# Patient Record
Sex: MALE | Employment: UNEMPLOYED
[De-identification: names, ages, dates, MRNs, and addresses within clinical notes are randomized per-mention and may not be internally consistent; named-entity substitution may affect disease eponyms.]

---
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
## 2023-02-08 DIAGNOSIS — Q20.8 FONTAN CIRCULATION PRESENT: Primary | ICD-10-CM

## 2023-02-08 DIAGNOSIS — Q22.4 TRICUSPID ATRESIA: ICD-10-CM

## 2023-06-07 ENCOUNTER — APPOINTMENT (OUTPATIENT)
Dept: URBAN - METROPOLITAN AREA CLINIC 247 | Age: 18
Setting detail: DERMATOLOGY
End: 2023-06-07

## 2023-06-07 DIAGNOSIS — L70.0 ACNE VULGARIS: ICD-10-CM

## 2023-06-07 PROCEDURE — OTHER PRESCRIPTION: OTHER

## 2023-06-07 PROCEDURE — OTHER COUNSELING: OTHER

## 2023-06-07 PROCEDURE — OTHER PRESCRIPTION MEDICATION MANAGEMENT: OTHER

## 2023-06-07 PROCEDURE — OTHER MIPS QUALITY: OTHER

## 2023-06-07 PROCEDURE — 99214 OFFICE O/P EST MOD 30 MIN: CPT

## 2023-06-07 RX ORDER — CLASCOTERONE 1 G/100G
CREAM TOPICAL
Qty: 60 | Refills: 5 | Status: ERX | COMMUNITY
Start: 2023-06-07

## 2023-06-07 RX ORDER — TRETIONIN 0.25 MG/G
CREAM TOPICAL QHS
Qty: 45 | Refills: 5 | Status: ERX | COMMUNITY
Start: 2023-06-07

## 2023-06-07 ASSESSMENT — LOCATION ZONE DERM
LOCATION ZONE: TRUNK
LOCATION ZONE: FACE

## 2023-06-07 ASSESSMENT — LOCATION DETAILED DESCRIPTION DERM
LOCATION DETAILED: LEFT MEDIAL MALAR CHEEK
LOCATION DETAILED: LEFT SUPERIOR UPPER BACK

## 2023-06-07 ASSESSMENT — LOCATION SIMPLE DESCRIPTION DERM
LOCATION SIMPLE: LEFT CHEEK
LOCATION SIMPLE: LEFT UPPER BACK

## 2023-06-07 NOTE — PROCEDURE: COUNSELING
Minocycline Pregnancy And Lactation Text: This medication is Pregnancy Category D and not consider safe during pregnancy. It is also excreted in breast milk.
High Dose Vitamin A Pregnancy And Lactation Text: High dose vitamin A therapy is contraindicated during pregnancy and breast feeding.
Topical Retinoid counseling:  Patient advised to apply a pea-sized amount only at bedtime and wait 30 minutes after washing their face before applying.  If too drying, patient may add a non-comedogenic moisturizer. The patient verbalized understanding of the proper use and possible adverse effects of retinoids.  All of the patient's questions and concerns were addressed.
Aklief Pregnancy And Lactation Text: It is unknown if this medication is safe to use during pregnancy.  It is unknown if this medication is excreted in breast milk.  Breastfeeding women should use the topical cream on the smallest area of the skin for the shortest time needed while breastfeeding.  Do not apply to nipple and areola.
Benzoyl Peroxide Pregnancy And Lactation Text: This medication is Pregnancy Category C. It is unknown if benzoyl peroxide is excreted in breast milk.
Erythromycin Counseling:  I discussed with the patient the risks of erythromycin including but not limited to GI upset, allergic reaction, drug rash, diarrhea, increase in liver enzymes, and yeast infections.
Topical Retinoid Pregnancy And Lactation Text: This medication is Pregnancy Category C. It is unknown if this medication is excreted in breast milk.
Topical Sulfur Applications Counseling: Topical Sulfur Counseling: Patient counseled that this medication may cause skin irritation or allergic reactions.  In the event of skin irritation, the patient was advised to reduce the amount of the drug applied or use it less frequently.   The patient verbalized understanding of the proper use and possible adverse effects of topical sulfur application.  All of the patient's questions and concerns were addressed.
Detail Level: Zone
Topical Clindamycin Counseling: Patient counseled that this medication may cause skin irritation or allergic reactions.  In the event of skin irritation, the patient was advised to reduce the amount of the drug applied or use it less frequently.   The patient verbalized understanding of the proper use and possible adverse effects of clindamycin.  All of the patient's questions and concerns were addressed.
Isotretinoin Counseling: Patient should get monthly blood tests, not donate blood, not drive at night if vision affected, not share medication, and not undergo elective surgery for 6 months after tx completed. Side effects reviewed, pt to contact office should one occur.
High Dose Vitamin A Counseling: Side effects reviewed, pt to contact office should one occur.
Spironolactone Counseling: Patient advised regarding risks of diarrhea, abdominal pain, hyperkalemia, birth defects (for female patients), liver toxicity and renal toxicity. The patient may need blood work to monitor liver and kidney function and potassium levels while on therapy. The patient verbalized understanding of the proper use and possible adverse effects of spironolactone.  All of the patient's questions and concerns were addressed.
Topical Sulfur Applications Pregnancy And Lactation Text: This medication is Pregnancy Category C and has an unknown safety profile during pregnancy. It is unknown if this topical medication is excreted in breast milk.
Dapsone Pregnancy And Lactation Text: This medication is Pregnancy Category C and is not considered safe during pregnancy or breast feeding.
Benzoyl Peroxide Counseling: Patient counseled that medicine may cause skin irritation and bleach clothing.  In the event of skin irritation, the patient was advised to reduce the amount of the drug applied or use it less frequently.   The patient verbalized understanding of the proper use and possible adverse effects of benzoyl peroxide.  All of the patient's questions and concerns were addressed.
Bpo Recommendations: OTC Cerave 4% BPO wash
Winlevi Pregnancy And Lactation Text: This medication is considered safe during pregnancy and breastfeeding.
Birth Control Pills Pregnancy And Lactation Text: This medication should be avoided if pregnant and for the first 30 days post-partum.
Azithromycin Pregnancy And Lactation Text: This medication is considered safe during pregnancy and is also secreted in breast milk.
Minocycline Counseling: Patient advised regarding possible photosensitivity and discoloration of the teeth, skin, lips, tongue and gums.  Patient instructed to avoid sunlight, if possible.  When exposed to sunlight, patients should wear protective clothing, sunglasses, and sunscreen.  The patient was instructed to call the office immediately if the following severe adverse effects occur:  hearing changes, easy bruising/bleeding, severe headache, or vision changes.  The patient verbalized understanding of the proper use and possible adverse effects of minocycline.  All of the patient's questions and concerns were addressed.
Azelaic Acid Pregnancy And Lactation Text: This medication is considered safe during pregnancy and breast feeding.
Erythromycin Pregnancy And Lactation Text: This medication is Pregnancy Category B and is considered safe during pregnancy. It is also excreted in breast milk.
Birth Control Pills Counseling: Birth Control Pill Counseling: I discussed with the patient the potential side effects of OCPs including but not limited to increased risk of stroke, heart attack, thrombophlebitis, deep venous thrombosis, hepatic adenomas, breast changes, GI upset, headaches, and depression.  The patient verbalized understanding of the proper use and possible adverse effects of OCPs. All of the patient's questions and concerns were addressed.
Tazorac Counseling:  Patient advised that medication is irritating and drying.  Patient may need to apply sparingly and wash off after an hour before eventually leaving it on overnight.  The patient verbalized understanding of the proper use and possible adverse effects of tazorac.  All of the patient's questions and concerns were addressed.
Use Enhanced Medication Counseling?: No
Tazorac Pregnancy And Lactation Text: This medication is not safe during pregnancy. It is unknown if this medication is excreted in breast milk.
Cleanser Recommendations: cerave or cetaphil gentle cleanser
Azithromycin Counseling:  I discussed with the patient the risks of azithromycin including but not limited to GI upset, allergic reaction, drug rash, diarrhea, and yeast infections.
Sarecycline Counseling: Patient advised regarding possible photosensitivity and discoloration of the teeth, skin, lips, tongue and gums.  Patient instructed to avoid sunlight, if possible.  When exposed to sunlight, patients should wear protective clothing, sunglasses, and sunscreen.  The patient was instructed to call the office immediately if the following severe adverse effects occur:  hearing changes, easy bruising/bleeding, severe headache, or vision changes.  The patient verbalized understanding of the proper use and possible adverse effects of sarecycline.  All of the patient's questions and concerns were addressed.
Sunscreen Recommendations: zinc oxide spf 30-50 with reapplication every 2 hours\\nelta md clear tinted
Isotretinoin Pregnancy And Lactation Text: This medication is Pregnancy Category X and is considered extremely dangerous during pregnancy. It is unknown if it is excreted in breast milk.
Spironolactone Pregnancy And Lactation Text: This medication can cause feminization of the male fetus and should be avoided during pregnancy. The active metabolite is also found in breast milk.
Winlevi Counseling:  I discussed with the patient the risks of topical clascoterone including but not limited to erythema, scaling, itching, and stinging. Patient voiced their understanding.
Moisturizer Recommendations: neutrogena hydroboost\\ncetaphil/cerave
Doxycycline Pregnancy And Lactation Text: This medication is Pregnancy Category D and not consider safe during pregnancy. It is also excreted in breast milk but is considered safe for shorter treatment courses.
Bactrim Pregnancy And Lactation Text: This medication is Pregnancy Category D and is known to cause fetal risk.  It is also excreted in breast milk.
Dapsone Counseling: I discussed with the patient the risks of dapsone including but not limited to hemolytic anemia, agranulocytosis, rashes, methemoglobinemia, kidney failure, peripheral neuropathy, headaches, GI upset, and liver toxicity.  Patients who start dapsone require monitoring including baseline LFTs and weekly CBCs for the first month, then every month thereafter.  The patient verbalized understanding of the proper use and possible adverse effects of dapsone.  All of the patient's questions and concerns were addressed.
Topical Clindamycin Pregnancy And Lactation Text: This medication is Pregnancy Category B and is considered safe during pregnancy. It is unknown if it is excreted in breast milk.
Doxycycline Counseling:  Patient counseled regarding possible photosensitivity and increased risk for sunburn.  Patient instructed to avoid sunlight, if possible.  When exposed to sunlight, patients should wear protective clothing, sunglasses, and sunscreen.  The patient was instructed to call the office immediately if the following severe adverse effects occur:  hearing changes, easy bruising/bleeding, severe headache, or vision changes.  The patient verbalized understanding of the proper use and possible adverse effects of doxycycline.  All of the patient's questions and concerns were addressed.
Aklief counseling:  Patient advised to apply a pea-sized amount only at bedtime and wait 30 minutes after washing their face before applying.  If too drying, patient may add a non-comedogenic moisturizer.  The most commonly reported side effects including irritation, redness, scaling, dryness, stinging, burning, itching, and increased risk of sunburn.  The patient verbalized understanding of the proper use and possible adverse effects of retinoids.  All of the patient's questions and concerns were addressed.
Azelaic Acid Counseling: Patient counseled that medicine may cause skin irritation and to avoid applying near the eyes.  In the event of skin irritation, the patient was advised to reduce the amount of the drug applied or use it less frequently.   The patient verbalized understanding of the proper use and possible adverse effects of azelaic acid.  All of the patient's questions and concerns were addressed.
Tetracycline Counseling: Patient counseled regarding possible photosensitivity and increased risk for sunburn.  Patient instructed to avoid sunlight, if possible.  When exposed to sunlight, patients should wear protective clothing, sunglasses, and sunscreen.  The patient was instructed to call the office immediately if the following severe adverse effects occur:  hearing changes, easy bruising/bleeding, severe headache, or vision changes.  The patient verbalized understanding of the proper use and possible adverse effects of tetracycline.  All of the patient's questions and concerns were addressed. Patient understands to avoid pregnancy while on therapy due to potential birth defects.
Bactrim Counseling:  I discussed with the patient the risks of sulfa antibiotics including but not limited to GI upset, allergic reaction, drug rash, diarrhea, dizziness, photosensitivity, and yeast infections.  Rarely, more serious reactions can occur including but not limited to aplastic anemia, agranulocytosis, methemoglobinemia, blood dyscrasias, liver or kidney failure, lung infiltrates or desquamative/blistering drug rashes.

## 2023-06-07 NOTE — PROCEDURE: PRESCRIPTION MEDICATION MANAGEMENT
Render In Strict Bullet Format?: No
Initiate Treatment: Winlevi 1 % topical cream \\nQuantity: 60.0 g  Days Supply: 30\\nSig: Apply to entire face twice a day, follow with moisturizer and SPF in daytime.
Samples Given: Winlevi
Discontinue Regimen: Amzeeq
Detail Level: Zone
Continue Regimen: tretinoin 0.025 % topical cream QHS\\nQuantity: 45.0 g  Days Supply: 30\\nSig: Apply a pea sized amount at bedtime, follow with moisturizer

## 2023-06-15 ENCOUNTER — TELEPHONE (OUTPATIENT)
Dept: PEDIATRIC CARDIOLOGY | Age: 18
End: 2023-06-15

## 2023-06-16 ENCOUNTER — APPOINTMENT (OUTPATIENT)
Dept: CARDIOLOGY | Age: 18
End: 2023-06-16
Attending: PEDIATRICS

## 2023-07-07 ENCOUNTER — OFFICE VISIT (OUTPATIENT)
Dept: CARDIOLOGY | Age: 18
End: 2023-07-07

## 2023-07-07 ENCOUNTER — ANCILLARY PROCEDURE (OUTPATIENT)
Dept: PEDIATRIC CARDIOLOGY | Age: 18
End: 2023-07-07
Attending: PEDIATRICS

## 2023-07-07 VITALS
HEART RATE: 105 BPM | DIASTOLIC BLOOD PRESSURE: 70 MMHG | WEIGHT: 166.01 LBS | TEMPERATURE: 99.3 F | HEIGHT: 68 IN | SYSTOLIC BLOOD PRESSURE: 118 MMHG | OXYGEN SATURATION: 92 % | BODY MASS INDEX: 25.16 KG/M2 | RESPIRATION RATE: 16 BRPM

## 2023-07-07 DIAGNOSIS — Q20.8 FONTAN CIRCULATION PRESENT: ICD-10-CM

## 2023-07-07 DIAGNOSIS — Q22.4 TRICUSPID ATRESIA: ICD-10-CM

## 2023-07-07 DIAGNOSIS — Z98.890 STATUS POST FONTAN PROCEDURE: ICD-10-CM

## 2023-07-07 DIAGNOSIS — Q20.4 SINGLE VENTRICLE: Primary | ICD-10-CM

## 2023-07-07 PROCEDURE — 99211 OFF/OP EST MAY X REQ PHY/QHP: CPT

## 2023-07-07 PROCEDURE — 93325 DOPPLER ECHO COLOR FLOW MAPG: CPT | Performed by: PEDIATRICS

## 2023-07-07 PROCEDURE — 93320 DOPPLER ECHO COMPLETE: CPT | Performed by: PEDIATRICS

## 2023-07-07 PROCEDURE — 93320 DOPPLER ECHO COMPLETE: CPT

## 2023-07-07 PROCEDURE — 99215 OFFICE O/P EST HI 40 MIN: CPT | Performed by: PEDIATRICS

## 2023-07-07 PROCEDURE — 93303 ECHO TRANSTHORACIC: CPT | Performed by: PEDIATRICS

## 2023-07-10 LAB
AORTIC ROOT: 2.96 CM (ref 2.35–3.34)
AORTIC VALVE ANNULUS: 2.4 CM (ref 1.66–2.43)
BSA FOR PED ECHO PROCEDURE: 1.91 M2
EJECTION FRACTION: 57 %
FRACTIONAL SHORTENING MMODE: 32 %
LEFT VENTRICLE EJECTION FRACTION BY TEICHOLZ 2D (%): 57 %
LEFT VENTRICLE EJECTION FRACTION BY TEICHOLZ M-MODE (%): 60 %
LV END-DIASTOLIC ENDOCARDIAL DIAMETER MMODE: 4.99 CM (ref 4.33–6.08)
LVIDS BY MMODE: 3.39 CM
SINOTUBULAR JUNCTION: 2.59 CM (ref 1.9–2.77)
Z SCORE OF AORTIC VALVE ANNULUS PHN: 1.8 CM
Z SCORE OF LV END-DIASTOLIC ENDOCARDIAL DIAMETER MMODE: -0.5 CM
Z-SCORE OF AORTIC ROOT: 0.5 CM
Z-SCORE OF SINOTUBULAR JUNCTION PHN: 1.2 CM

## 2023-07-10 RX ORDER — TADALAFIL 20 MG/1
20 TABLET ORAL DAILY
Qty: 90 TABLET | Refills: 3 | Status: SHIPPED | OUTPATIENT
Start: 2023-07-10

## 2023-07-17 ENCOUNTER — TELEPHONE (OUTPATIENT)
Dept: PEDIATRIC CARDIOLOGY | Age: 18
End: 2023-07-17

## 2023-08-25 ENCOUNTER — OFFICE VISIT (OUTPATIENT)
Dept: CARDIOLOGY | Age: 18
End: 2023-08-25

## 2023-08-25 VITALS
HEART RATE: 88 BPM | DIASTOLIC BLOOD PRESSURE: 65 MMHG | BODY MASS INDEX: 24.52 KG/M2 | OXYGEN SATURATION: 94 % | SYSTOLIC BLOOD PRESSURE: 120 MMHG | HEIGHT: 68 IN | RESPIRATION RATE: 16 BRPM | WEIGHT: 161.82 LBS | TEMPERATURE: 99.7 F

## 2023-08-25 DIAGNOSIS — Q20.4 SINGLE VENTRICLE: Primary | ICD-10-CM

## 2023-08-25 DIAGNOSIS — Z98.890 STATUS POST FONTAN PROCEDURE: ICD-10-CM

## 2023-08-25 PROCEDURE — 99215 OFFICE O/P EST HI 40 MIN: CPT | Performed by: PEDIATRICS

## 2023-08-25 PROCEDURE — 99211 OFF/OP EST MAY X REQ PHY/QHP: CPT

## 2023-12-27 ENCOUNTER — OFFICE VISIT (OUTPATIENT)
Dept: OTHER | Facility: HOSPITAL | Age: 18
End: 2023-12-27
Attending: EMERGENCY MEDICINE

## 2023-12-27 DIAGNOSIS — Z00.00 ROUTINE GENERAL MEDICAL EXAMINATION AT A HEALTH CARE FACILITY: Primary | ICD-10-CM

## 2023-12-27 LAB
ALBUMIN SERPL-MCNC: 4.6 G/DL (ref 3.2–4.8)
ALBUMIN/GLOB SERPL: 1.5 {RATIO} (ref 1–2)
ALP LIVER SERPL-CCNC: 70 U/L
ALT SERPL-CCNC: 33 U/L
ANION GAP SERPL CALC-SCNC: 4 MMOL/L (ref 0–18)
AST SERPL-CCNC: 28 U/L (ref ?–34)
ATRIAL RATE: 79 BPM
BASOPHILS # BLD AUTO: 0.02 X10(3) UL (ref 0–0.2)
BASOPHILS NFR BLD AUTO: 0.4 %
BILIRUB SERPL-MCNC: 1.8 MG/DL (ref 0.3–1.2)
BILIRUB UR QL: NEGATIVE
BUN BLD-MCNC: 11 MG/DL (ref 9–23)
BUN/CREAT SERPL: 9.7 (ref 10–20)
CALCIUM BLD-MCNC: 9.6 MG/DL (ref 8.7–10.4)
CHLORIDE SERPL-SCNC: 109 MMOL/L (ref 98–112)
CHOLEST SERPL-MCNC: 147 MG/DL (ref ?–200)
CLARITY UR: CLEAR
CO2 SERPL-SCNC: 26 MMOL/L (ref 21–32)
CREAT BLD-MCNC: 1.13 MG/DL
DEPRECATED RDW RBC AUTO: 43.1 FL (ref 35.1–46.3)
EGFRCR SERPLBLD CKD-EPI 2021: 95 ML/MIN/1.73M2 (ref 60–?)
EOSINOPHIL # BLD AUTO: 0.16 X10(3) UL (ref 0–0.7)
EOSINOPHIL NFR BLD AUTO: 3.1 %
ERYTHROCYTE [DISTWIDTH] IN BLOOD BY AUTOMATED COUNT: 12.9 % (ref 11–15)
FASTING PATIENT LIPID ANSWER: YES
FASTING STATUS PATIENT QL REPORTED: YES
GLOBULIN PLAS-MCNC: 3 G/DL (ref 2.8–4.4)
GLUCOSE BLD-MCNC: 88 MG/DL (ref 70–99)
GLUCOSE UR-MCNC: NORMAL MG/DL
HCT VFR BLD AUTO: 47.2 %
HDLC SERPL-MCNC: 55 MG/DL (ref 40–59)
HGB BLD-MCNC: 16.3 G/DL
HGB UR QL STRIP.AUTO: NEGATIVE
IMM GRANULOCYTES # BLD AUTO: 0.03 X10(3) UL (ref 0–1)
IMM GRANULOCYTES NFR BLD: 0.6 %
KETONES UR-MCNC: NEGATIVE MG/DL
LDLC SERPL CALC-MCNC: 81 MG/DL (ref ?–100)
LEUKOCYTE ESTERASE UR QL STRIP.AUTO: NEGATIVE
LYMPHOCYTES # BLD AUTO: 1.64 X10(3) UL (ref 1–4)
LYMPHOCYTES NFR BLD AUTO: 31.5 %
MCH RBC QN AUTO: 31.2 PG (ref 26–34)
MCHC RBC AUTO-ENTMCNC: 34.5 G/DL (ref 31–37)
MCV RBC AUTO: 90.4 FL
MONOCYTES # BLD AUTO: 0.39 X10(3) UL (ref 0.1–1)
MONOCYTES NFR BLD AUTO: 7.5 %
NEUTROPHILS # BLD AUTO: 2.96 X10 (3) UL (ref 1.5–7.7)
NEUTROPHILS # BLD AUTO: 2.96 X10(3) UL (ref 1.5–7.7)
NEUTROPHILS NFR BLD AUTO: 56.9 %
NITRITE UR QL STRIP.AUTO: NEGATIVE
NONHDLC SERPL-MCNC: 92 MG/DL (ref ?–130)
OSMOLALITY SERPL CALC.SUM OF ELEC: 287 MOSM/KG (ref 275–295)
P AXIS: 33 DEGREES
P-R INTERVAL: 108 MS
PH UR: 5.5 [PH] (ref 5–8)
PLATELET # BLD AUTO: 128 10(3)UL (ref 150–450)
POTASSIUM SERPL-SCNC: 3.6 MMOL/L (ref 3.5–5.1)
PROT SERPL-MCNC: 7.6 G/DL (ref 5.7–8.2)
PROT UR-MCNC: NEGATIVE MG/DL
Q-T INTERVAL: 358 MS
QRS DURATION: 114 MS
QTC CALCULATION (BEZET): 410 MS
R AXIS: -42 DEGREES
RBC # BLD AUTO: 5.22 X10(6)UL
SODIUM SERPL-SCNC: 139 MMOL/L (ref 136–145)
SP GR UR STRIP: 1.02 (ref 1–1.03)
T AXIS: 42 DEGREES
TRIGL SERPL-MCNC: 53 MG/DL (ref 30–149)
UROBILINOGEN UR STRIP-ACNC: NORMAL
VENTRICULAR RATE: 79 BPM
VLDLC SERPL CALC-MCNC: 8 MG/DL (ref 0–30)
WBC # BLD AUTO: 5.2 X10(3) UL (ref 4–11)

## 2023-12-27 PROCEDURE — 93010 ELECTROCARDIOGRAM REPORT: CPT | Performed by: INTERNAL MEDICINE

## 2023-12-27 PROCEDURE — 80061 LIPID PANEL: CPT

## 2023-12-27 PROCEDURE — 81003 URINALYSIS AUTO W/O SCOPE: CPT

## 2023-12-27 PROCEDURE — 93005 ELECTROCARDIOGRAM TRACING: CPT

## 2023-12-27 PROCEDURE — 80053 COMPREHEN METABOLIC PANEL: CPT

## 2023-12-27 PROCEDURE — 85025 COMPLETE CBC W/AUTO DIFF WBC: CPT

## 2024-07-01 ENCOUNTER — OFFICE VISIT (OUTPATIENT)
Dept: OTHER | Facility: HOSPITAL | Age: 19
End: 2024-07-01
Attending: EMERGENCY MEDICINE

## 2024-07-01 ENCOUNTER — HOSPITAL ENCOUNTER (OUTPATIENT)
Dept: GENERAL RADIOLOGY | Facility: HOSPITAL | Age: 19
Discharge: HOME | End: 2024-07-01
Attending: EMERGENCY MEDICINE

## 2024-07-01 DIAGNOSIS — R52 PAIN: Primary | ICD-10-CM

## 2024-07-01 DIAGNOSIS — R52 PAIN: ICD-10-CM

## 2024-07-01 PROCEDURE — 73564 X-RAY EXAM KNEE 4 OR MORE: CPT | Performed by: EMERGENCY MEDICINE

## 2024-07-11 ENCOUNTER — OFFICE VISIT (OUTPATIENT)
Dept: ORTHOPEDICS CLINIC | Facility: CLINIC | Age: 19
End: 2024-07-11

## 2024-07-11 VITALS — HEIGHT: 68 IN | BODY MASS INDEX: 24.25 KG/M2 | WEIGHT: 160 LBS

## 2024-07-11 DIAGNOSIS — M79.5 FOREIGN BODY (FB) IN SOFT TISSUE: Primary | ICD-10-CM

## 2024-07-11 PROCEDURE — 99244 OFF/OP CNSLTJ NEW/EST MOD 40: CPT | Performed by: ORTHOPAEDIC SURGERY

## 2024-07-11 NOTE — PROGRESS NOTES
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  He had a work-related injury on July 1.  He works as a .  They were doing a training exercise with a heavy saw.  He was cutting through metal rebar and some shavings from the rebar struck his right anterolateral thigh.  He developed multiple small lacerations related to the metal.  He had x-rays which showed foreign bodies in his soft tissue.  He has minimal pain currently unless he applies direct pressure to the area.  No mechanical clicking or popping in the knee no significant swelling in the knee.  He has no difficulty walking.  There is no difficulty sleeping at night    Past Medical History:    Congenital heart disease (HCC)    Eczema    Food allergy    Recurrent acute otitis media     Past Surgical History:   Procedure Laterality Date    Cardiac surgery       Current Outpatient Medications   Medication Sig Dispense Refill    aspirin 81 MG Oral Tab Take 81 mg by mouth daily. take 1 tablet (81MG)  by oral route  every day      Enalapril Maleate (VASOTEC) 2.5 MG Oral Tab Take 2.5 mg by mouth daily. take 1 tablet (2.5MG)  by oral route  every day       No Known Allergies  Family History   Problem Relation Age of Onset    Heart Disease Mother     High Blood Pressure Mother     Heart Disease Father     High Blood Pressure Father     High Blood Pressure Maternal Grandmother     Heart Disease Maternal Grandmother     Arthritis Maternal Grandmother     Stroke Maternal Grandmother     Heart Disease Maternal Grandfather     High Blood Pressure Maternal Grandfather     Heart Disease Paternal  Grandfather     High Blood Pressure Paternal Grandfather     Stroke Paternal Grandfather        Social History     Occupational History    Not on file   Tobacco Use    Smoking status: Not on file    Smokeless tobacco: Not on file   Substance and Sexual Activity    Alcohol use: Not on file    Drug use: Not on file    Sexual activity: Not on file        Review of Systems:  GENERAL: denies fevers, chills, night sweats, fatigue, unintentional weight loss/gain  SKIN: denies skin lesions, open sores, rash  HEENT:denies recent vision change, new nasal congestion,hearing loss, tinnitus, sore throat, headaches  RESPIRATORY: denies new shortness of breath, cough, asthma, wheezing  CARDIOVASCULAR: denies chest pain, leg cramps with exertion, palpitations, leg swelling  GI: denies abdominal pain, nausea, vomiting, diarrhea, constipation, hematochezia, worsening heartburn or stomach ulcers  : denies dysuria, hematuria, incontinence, increased frequency, urgency, difficulty urinating  MUSCULOSKELETAL: denies musculoskeletal complaints other than in HPI  NEURO: denies numbness, tingling, weakness, balance issues, dizziness, memory loss  PSYCHIATRIC: denies Hx of depression, anxiety, other psychiatric disorders  HEMATOLOGIC: denies blood clots, anemia, blood clotting disorders, blood transfusion  ENDOCRINE: denies autoimmune disease, thyroid issues, or diabetes  ALLERGY: denies asthma, seasonal allergies    Physical Examination:    Ht 5' 8\" (1.727 m)   Wt 160 lb (72.6 kg)   BMI 24.33 kg/m²   Constitutional: appears well hydrated, alert and responsive, no acute distress noted  Extremities: Right thigh with multiple small abrasion wounds.  2 of the wounds are more tender with palpable foreign body in the subcutaneous layer.  No erythema or cellulitic changes.  Right knee normal to inspection palpation range of motion testing.  Right hip normal to inspection palpation range of motion testing.  Thigh compartments are  soft  Neurological: Light touch and pinprick sensation intact throughout the lower extremities.  Ankle dorsiflexion plantarflexion EHL knee extension and hip flexion strength are 5 out of 5 bilaterally.  No clonus.    Imaging:   XR KNEE, COMPLETE (4 OR MORE VIEWS), RIGHT (CPT=73564)    Result Date: 7/1/2024  PROCEDURE: XR KNEE, COMPLETE (4 OR MORE VIEWS), RIGHT (CPT=73564)  COMPARISON: None.  INDICATIONS: Pain and brusing of right knee post injury today.  Evaluation for foreign body.  TECHNIQUE: 4 views were obtained.   FINDINGS:  BONES: No evidence of acute fracture or dislocation.  No significant degenerative change. SOFT TISSUES: There is a 0.3 cm radiopaque density in the distal anterior lateral soft tissues of the thigh at the level of the distal femoral metaphysis.  There is also a 0.4 cm radiopaque density in the more superior anterior lateral thigh soft tissues. EFFUSION: None visible. OTHER: Negative.         CONCLUSION:   No evidence of acute osseous abnormality.  Two small radiopaque foreign bodies in the anterior lateral distal thigh soft tissues.  The more proximal radiopaque foreign bodies at the level of the distal femoral diaphysis and the more distal foreign bodies at the level of the distal femoral metaphysis.    Dictated by (CST): Hector Shields MD on 7/01/2024 at 3:51 PM     Finalized by (CST): Hector Shields MD on 7/01/2024 at 3:54 PM             Labs:  Lab Results   Component Value Date    WBC 5.2 12/27/2023    HGB 16.3 12/27/2023    .0 (L) 12/27/2023      Lab Results   Component Value Date    GLU 88 12/27/2023    BUN 11 12/27/2023    CREATSERUM 1.13 12/27/2023        Assessment and Plan:  Diagnoses and all orders for this visit:    Foreign body (FB) in soft tissue        Assessment: Right thigh subcutaneous metallic foreign bodies    Plan: I recommend surgical treatment consisting of foreign body removal when mutually convenient.  I see no urgency to the surgery.  Will attempt to  schedule the surgery in the coming weeks.  Risks and benefits of surgery were discussed.  Questions were answered.  No guarantees were made.  Leaving the foreign bodies in place may cause continued local irritation and potential for infection.  Mother was present at the visit today.  He may continue to work without restrictions.  Date of max medical improvement is estimated at 1 week from the date of the surgical procedure.    Follow Up: Return for follow-up visit one week after surgery.    BARBARA STEPHENSON MD

## 2024-07-12 ENCOUNTER — TELEPHONE (OUTPATIENT)
Dept: ORTHOPEDICS CLINIC | Facility: CLINIC | Age: 19
End: 2024-07-12

## 2024-07-12 DIAGNOSIS — M79.5 FOREIGN BODY (FB) IN SOFT TISSUE: Primary | ICD-10-CM

## 2024-07-12 NOTE — TELEPHONE ENCOUNTER
Type of surgery:  Right thigh foreign body removal   Date: 8/28/24  Location: [facility]  Medical Clearance: No     *Medical:      *Dental:      *Other:  Prior Authorization Status: Pending   Workers Comp:  [place]:  [place]: Yes  POV: 9/4/24

## 2024-07-12 NOTE — TELEPHONE ENCOUNTER
Spoke with patient to offer surgery dates. He chose 8/28 at [facility]

## 2024-07-25 RX ORDER — TADALAFIL 20 MG/1
20 TABLET ORAL DAILY
Qty: 90 TABLET | Refills: 3 | Status: SHIPPED | OUTPATIENT
Start: 2024-07-25

## 2024-08-13 ENCOUNTER — TELEPHONE (OUTPATIENT)
Dept: ORTHOPEDICS CLINIC | Facility: CLINIC | Age: 19
End: 2024-08-13

## 2024-08-13 NOTE — TELEPHONE ENCOUNTER
Patient stopped in office today to provide Workers Compensation Claim information for DOS 07/11/2024 in Orthopedics with Dr. TREY Arias. Called to speak with patient since original paperwork he completed was not very legible. Patient did not have billing address. Called and left message with  to get billing address for claims.    Injury: 07/01/2024  Right Thigh   Employer: [employer] [address] [place]. [address]  Employer #: [phone] - Charly Martins    Claim #: [id number]    : Shell Coello - PH: [phone] Fax: [phone]  Charly Joel - PH: [phone]    [employer]

## 2024-08-20 RX ORDER — MULTIVITAMIN
1 TABLET ORAL DAILY
COMMUNITY

## 2024-08-21 DIAGNOSIS — Q22.4 TRICUSPID ATRESIA (CMD): ICD-10-CM

## 2024-08-21 DIAGNOSIS — Z48.816 AFTERCARE FOR CIRCUMCISION: Primary | ICD-10-CM

## 2024-08-21 DIAGNOSIS — Q20.8 FONTAN CIRCULATION PRESENT (CMD): ICD-10-CM

## 2024-08-21 NOTE — H&P
[facility]  part of [facility]    History & Physical    Ori Umana Jr. Patient Status:  Hospital Outpatient Surgery    2005 MRN [id number]   Location [facility] OPERATING ROOM Attending Jae Arias MD   Hosp Day # 0 PCP Renzo Magaña MD     Date:  2024  Date of Admission:  (Not on file)    History provided by:patient  Chief Complaint:   No chief complaint on file.    Right thigh pain   HPI:   Ori Umana Jr. is a(n) 19 year old male. Presents today with complaints of right thigh pain.  He had a work-related injury on .  He works as a .  They were doing a training exercise with a heavy saw.  He was cutting through metal rebar and some shavings from the rebar struck his right anterolateral thigh.  He developed multiple small lacerations related to the metal.  He had x-rays which showed foreign bodies in his soft tissue.  He has minimal pain currently unless he applies direct pressure to the area.  No mechanical clicking or popping in the knee no significant swelling in the knee.  He has no difficulty walking.  There is no difficulty sleeping at night     History     Past Medical History:    Congenital anomaly of heart (HCC)    Congenital heart disease (HCC)    Eczema    Food allergy    Recurrent acute otitis media     Past Surgical History:   Procedure Laterality Date    Cardiac surgery       Family History   Problem Relation Age of Onset    Heart Disease Father     High Blood Pressure Father     Heart Disease Mother     High Blood Pressure Mother     High Blood Pressure Maternal Grandmother     Heart Disease Maternal Grandmother     Arthritis Maternal Grandmother     Stroke Maternal Grandmother     Heart Disease Maternal Grandfather     High Blood Pressure Maternal Grandfather     Heart Disease Paternal Grandfather     High Blood Pressure Paternal Grandfather     Stroke Paternal Grandfather      Social History:  Social History      Socioeconomic History    Marital status: Single   Tobacco Use    Smoking status: Never    Smokeless tobacco: Never   Substance and Sexual Activity    Alcohol use: Never    Drug use: Never     Allergies/Medications:   Allergies:   Allergies   Allergen Reactions    Cashews ANGIOEDEMA and SWELLING    Diphenhydramine OTHER (SEE COMMENTS)     Becomes hyperactive with Benadryl use    Pistachios SWELLING    Shellfish Allergy HIVES     No medications prior to admission.       Review of Systems:   Pertinent items are noted in HPI.    Physical Exam:   Vital Signs:  Height 5' 8\" (1.727 m), weight 160 lb (72.6 kg).     General appearance: alert, appears stated age and cooperative  Extremities:  Right thigh with multiple small abrasion wounds.  2 of the wounds are more tender with palpable foreign body in the subcutaneous layer.  No erythema or cellulitic changes.  Right knee normal to inspection palpation range of motion testing.  Right hip normal to inspection palpation range of motion testing.  Thigh compartments are soft   Pulses: 2+ and symmetric  Neurologic: Alert and oriented X 3, normal strength and tone. Normal symmetric reflexes. Normal coordination and gait. Light touch and pinprick sensation intact throughout the lower extremities. Ankle dorsiflexion plantarflexion EHL knee extension and hip flexion strength are 5 out of 5 bilaterally. No clonus.         Results:     Lab Results   Component Value Date    WBC 5.2 12/27/2023    HGB 16.3 12/27/2023    HCT 47.2 12/27/2023    .0 (L) 12/27/2023    CREATSERUM 1.13 12/27/2023    BUN 11 12/27/2023     12/27/2023    K 3.6 12/27/2023     12/27/2023    CO2 26.0 12/27/2023    GLU 88 12/27/2023    CA 9.6 12/27/2023    ALB 4.6 12/27/2023    ALKPHO 70 12/27/2023    BILT 1.8 (H) 12/27/2023    TP 7.6 12/27/2023    AST 28 12/27/2023    ALT 33 12/27/2023       No results found.        Assessment/Plan:     * No active hospital problems. *    Assessment: Right thigh  subcutaneous metallic foreign bodies     Plan: I recommend surgical treatment consisting of foreign body removal when mutually convenient.  I see no urgency to the surgery.  Will attempt to schedule the surgery in the coming weeks.  Risks and benefits of surgery were discussed.  Questions were answered.  No guarantees were made.  Leaving the foreign bodies in place may cause continued local irritation and potential for infection.  Mother was present at the visit today.  He may continue to work without restrictions.  Date of max medical improvement is estimated at 1 week from the date of the surgical procedure.     Follow Up: Return for follow-up visit one week after surgery.    Angela Kirby PA-C  8/21/2024

## 2024-08-22 NOTE — DISCHARGE INSTRUCTIONS
Patient should remain off work as a  for 1 week to allow surgical sites to heal.    Follow up as outpatient in 1 week   Norco or tylenol or pain.  Ibuprofen for pain/inflammation.  May remove dressing and place bandaids over incisions in 2 days.  Keep incisions clean and dry.  Ice and elevate thigh.  Weight bear as tolerated.  Use walker/crutches for comfort.  Follow up with Dr. Arias/Angela Kirby in 1 week [phone].     HOME INSTRUCTIONS  [facility] HOME CARE INSTRUCTIONS: POST-OP ANESTHESIA  The medication that you received for sedation or general anesthesia can last up to 24 hours. Your judgment and reflexes may be altered, even if you feel like your normal self.      We Recommend:   Do not drive any motor vehicle or bicycle   Avoid mowing the lawn, playing sports, or working with power tools/applicances (power saws, electric knives or mixers)   That you have someone stay with you on your first night home   Do not drink alcohol or take sleeping pills or tranquilizers   Do not sign legal documents within 24 hours of your procedure   If you had a nerve block for your surgery, take extra care not to put any pressure on your arm or hand for 24 hours    It is normal:  For you to have a sore throat if you had a breathing tube during surgery (while you were asleep!). The sore throat should get better within 48 hours. You can gargle with warm salt water (1/2 tsp in 4 oz warm water) or use a throat lozenge for comfort  To feel muscle aches or soreness especially in the abdomen, chest or neck. The achy feeling should go away in the next 24 hours  To feel weak, sleepy or \"wiped out\". Your should start feeling better in the next 24 hours.   To experience mild discomforts such as sore lip or tongue, headache, cramps, gas pains or a bloated feeling in your abdomen.   To experience mild back pain or soreness for a day or two if you had spinal or epidural anesthesia.   If you had laparoscopic surgery, to feel  shoulder pain or discomfort on the day of surgery.   For some patients to have nausea after surgery/anesthesia    If you feel nausea or experience vomiting:   Try to move around less.   Eat less than usual or drink only liquids until the next morning   Nausea should resolve in about 24 hours    If you have a problem when you are at home:    Call your surgeons office   Discharge Instructions: After Your Surgery  You’ve just had surgery. During surgery, you were given medicine called anesthesia to keep you relaxed and free of pain. After surgery, you may have some pain or nausea. This is common. Here are some tips for feeling better and getting well after surgery.   Going home  Your healthcare provider will show you how to take care of yourself when you go home. They'll also answer your questions. Have an adult family member or friend drive you home. For the first 24 hours after your surgery:   Don't drive or use heavy equipment.  Don't make important decisions or sign legal papers.  Take medicines as directed.  Don't drink alcohol.  Have someone stay with you, if needed. They can watch for problems and help keep you safe.  Be sure to go to all follow-up visits with your healthcare provider. And rest after your surgery for as long as your provider tells you to.   Coping with pain  If you have pain after surgery, pain medicine will help you feel better. Take it as directed, before pain becomes severe. Also, ask your healthcare provider or pharmacist about other ways to control pain. This might be with heat, ice, or relaxation. And follow any other instructions your surgeon or nurse gives you.      Stay on schedule with your medicine.     Tips for taking pain medicine  To get the best relief possible, remember these points:   Pain medicines can upset your stomach. Taking them with a little food may help.  Most pain relievers taken by mouth need at least 20 to 30 minutes to start to work.  Don't wait till your pain  becomes severe before you take your medicine. Try to time your medicine so that you can take it before starting an activity. This might be before you get dressed, go for a walk, or sit down for dinner.  Constipation is a common side effect of some pain medicines. Call your healthcare provider before taking any medicines such as laxatives or stool softeners to help ease constipation. Also ask if you should skip any foods. Drinking lots of fluids and eating foods such as fruits and vegetables that are high in fiber can also help. Remember, don't take laxatives unless your surgeon has prescribed them.  Drinking alcohol and taking pain medicine can cause dizziness and slow your breathing. It can even be deadly. Don't drink alcohol while taking pain medicine.  Pain medicine can make you react more slowly to things. Don't drive or run machinery while taking pain medicine.  Your healthcare provider may tell you to take acetaminophen to help ease your pain. Ask them how much you're supposed to take each day. Acetaminophen or other pain relievers may interact with your prescription medicines or other over-the-counter (OTC) medicines. Some prescription medicines have acetaminophen and other ingredients in them. Using both prescription and OTC acetaminophen for pain can cause you to accidentally overdose. Read the labels on your OTC medicines with care. This will help you to clearly know the list of ingredients, how much to take, and any warnings. It may also help you not take too much acetaminophen. If you have questions or don't understand the information, ask your pharmacist or healthcare provider to explain it to you before you take the OTC medicine.   Managing nausea  Some people have an upset stomach (nausea) after surgery. This is often because of anesthesia, pain, or pain medicine, less movement of food in the stomach, or the stress of surgery. These tips will help you handle nausea and eat healthy foods as you get  better. If you were on a special food plan before surgery, ask your healthcare provider if you should follow it while you get better. Check with your provider on how your eating should progress. It may depend on the surgery you had. These general tips may help:   Don't push yourself to eat. Your body will tell you when to eat and how much.  Start off with clear liquids and soup. They're easier to digest.  Next try semi-solid foods as you feel ready. These include mashed potatoes, applesauce, and gelatin.  Slowly move to solid foods. Don’t eat fatty, rich, or spicy foods at first.  Don't force yourself to have 3 large meals a day. Instead eat smaller amounts more often.  Take pain medicines with a small amount of solid food, such as crackers or toast. This helps prevent nausea.  When to call your healthcare provider  Call your healthcare provider right away if you have any of these:   You still have too much pain, or the pain gets worse, after taking the medicine. The medicine may not be strong enough. Or there may be a complication from the surgery.  You feel too sleepy, dizzy, or groggy. The medicine may be too strong.  Side effects such as nausea or vomiting. Your healthcare provider may advise taking other medicines to .  Skin changes such as rash, itching, or hives. This may mean you have an allergic reaction. Your provider may advise taking other medicines.  The incision looks different (for instance, part of it opens up).  Bleeding or fluid leaking from the incision site, and weren't told to expect that.  Fever of 100.4°F (38°C) or higher, or as directed by your provider.  Call 911  Call 911 right away if you have:   Trouble breathing  Facial swelling    If you have obstructive sleep apnea   You were given anesthesia medicine during surgery to keep you comfortable and free of pain. After surgery, you may have more apnea spells because of this medicine and other medicines you were given. The spells may last  longer than normal.    At home:  Keep using the continuous positive airway pressure (CPAP) device when you sleep. Unless your healthcare provider tells you not to, use it when you sleep, day or night. CPAP is a common device used to treat obstructive sleep apnea.  Talk with your provider before taking any pain medicine, muscle relaxants, or sedatives. Your provider will tell you about the possible dangers of taking these medicines.  Contact your provider if your sleeping changes a lot even when taking medicines as directed.  [employer] last reviewed this educational content on 10/1/2021  © 5806-5142 [employer]. All rights reserved. This information is not intended as a substitute for professional medical care. Always follow your healthcare professional's instructions.

## 2024-08-23 ENCOUNTER — ANESTHESIA (OUTPATIENT)
Dept: SURGERY | Facility: HOSPITAL | Age: 19
End: 2024-08-23
Payer: OTHER MISCELLANEOUS

## 2024-08-23 ENCOUNTER — TELEPHONE (OUTPATIENT)
Dept: ORTHOPEDICS CLINIC | Facility: CLINIC | Age: 19
End: 2024-08-23

## 2024-08-23 ENCOUNTER — HOSPITAL ENCOUNTER (OUTPATIENT)
Facility: HOSPITAL | Age: 19
Setting detail: HOSPITAL OUTPATIENT SURGERY
Discharge: HOME | End: 2024-08-23
Attending: ORTHOPAEDIC SURGERY | Admitting: ORTHOPAEDIC SURGERY
Payer: OTHER MISCELLANEOUS

## 2024-08-23 ENCOUNTER — APPOINTMENT (OUTPATIENT)
Dept: GENERAL RADIOLOGY | Facility: HOSPITAL | Age: 19
End: 2024-08-23
Attending: ORTHOPAEDIC SURGERY
Payer: OTHER MISCELLANEOUS

## 2024-08-23 ENCOUNTER — ANESTHESIA EVENT (OUTPATIENT)
Dept: SURGERY | Facility: HOSPITAL | Age: 19
End: 2024-08-23
Payer: OTHER MISCELLANEOUS

## 2024-08-23 VITALS
SYSTOLIC BLOOD PRESSURE: 117 MMHG | WEIGHT: 163 LBS | HEART RATE: 61 BPM | BODY MASS INDEX: 24.71 KG/M2 | HEIGHT: 68 IN | OXYGEN SATURATION: 96 % | DIASTOLIC BLOOD PRESSURE: 61 MMHG | TEMPERATURE: 99 F | RESPIRATION RATE: 11 BRPM

## 2024-08-23 DIAGNOSIS — S70.351A FOREIGN BODY OF RIGHT THIGH, INITIAL ENCOUNTER: Primary | ICD-10-CM

## 2024-08-23 PROCEDURE — 88300 SURGICAL PATH GROSS: CPT | Performed by: ORTHOPAEDIC SURGERY

## 2024-08-23 PROCEDURE — 0JCL0ZZ EXTIRPATION OF MATTER FROM RIGHT UPPER LEG SUBCUTANEOUS TISSUE AND FASCIA, OPEN APPROACH: ICD-10-PCS | Performed by: ORTHOPAEDIC SURGERY

## 2024-08-23 PROCEDURE — 76000 FLUOROSCOPY <1 HR PHYS/QHP: CPT | Performed by: ORTHOPAEDIC SURGERY

## 2024-08-23 RX ORDER — LIDOCAINE HYDROCHLORIDE 20 MG/ML
INJECTION, SOLUTION EPIDURAL; INFILTRATION; INTRACAUDAL; PERINEURAL AS NEEDED
Status: DISCONTINUED | OUTPATIENT
Start: 2024-08-23 | End: 2024-08-23 | Stop reason: HOSPADM

## 2024-08-23 RX ORDER — MIDAZOLAM HYDROCHLORIDE 1 MG/ML
INJECTION INTRAMUSCULAR; INTRAVENOUS AS NEEDED
Status: DISCONTINUED | OUTPATIENT
Start: 2024-08-23 | End: 2024-08-23 | Stop reason: SURG

## 2024-08-23 RX ORDER — ONDANSETRON 2 MG/ML
4 INJECTION INTRAMUSCULAR; INTRAVENOUS EVERY 6 HOURS PRN
Status: DISCONTINUED | OUTPATIENT
Start: 2024-08-23 | End: 2024-08-23

## 2024-08-23 RX ORDER — HYDROMORPHONE HYDROCHLORIDE 1 MG/ML
0.6 INJECTION, SOLUTION INTRAMUSCULAR; INTRAVENOUS; SUBCUTANEOUS EVERY 5 MIN PRN
Status: DISCONTINUED | OUTPATIENT
Start: 2024-08-23 | End: 2024-08-23

## 2024-08-23 RX ORDER — ACETAMINOPHEN 500 MG
1000 TABLET ORAL ONCE
Status: COMPLETED | OUTPATIENT
Start: 2024-08-23 | End: 2024-08-23

## 2024-08-23 RX ORDER — HYDROCODONE BITARTRATE AND ACETAMINOPHEN 5; 325 MG/1; MG/1
1 TABLET ORAL EVERY 6 HOURS PRN
Qty: 3 TABLET | Refills: 0 | Status: SHIPPED | OUTPATIENT
Start: 2024-08-23

## 2024-08-23 RX ORDER — HYDROMORPHONE HYDROCHLORIDE 1 MG/ML
0.4 INJECTION, SOLUTION INTRAMUSCULAR; INTRAVENOUS; SUBCUTANEOUS EVERY 5 MIN PRN
Status: DISCONTINUED | OUTPATIENT
Start: 2024-08-23 | End: 2024-08-23

## 2024-08-23 RX ORDER — MORPHINE SULFATE 10 MG/ML
6 INJECTION, SOLUTION INTRAMUSCULAR; INTRAVENOUS EVERY 10 MIN PRN
Status: DISCONTINUED | OUTPATIENT
Start: 2024-08-23 | End: 2024-08-23

## 2024-08-23 RX ORDER — SODIUM CHLORIDE, SODIUM LACTATE, POTASSIUM CHLORIDE, CALCIUM CHLORIDE 600; 310; 30; 20 MG/100ML; MG/100ML; MG/100ML; MG/100ML
INJECTION, SOLUTION INTRAVENOUS CONTINUOUS
Status: DISCONTINUED | OUTPATIENT
Start: 2024-08-23 | End: 2024-08-23

## 2024-08-23 RX ORDER — PROCHLORPERAZINE EDISYLATE 5 MG/ML
5 INJECTION INTRAMUSCULAR; INTRAVENOUS EVERY 8 HOURS PRN
Status: DISCONTINUED | OUTPATIENT
Start: 2024-08-23 | End: 2024-08-23

## 2024-08-23 RX ORDER — MORPHINE SULFATE 4 MG/ML
2 INJECTION, SOLUTION INTRAMUSCULAR; INTRAVENOUS EVERY 10 MIN PRN
Status: DISCONTINUED | OUTPATIENT
Start: 2024-08-23 | End: 2024-08-23

## 2024-08-23 RX ORDER — HYDROCODONE BITARTRATE AND ACETAMINOPHEN 5; 325 MG/1; MG/1
1 TABLET ORAL EVERY 6 HOURS PRN
Status: DISCONTINUED | OUTPATIENT
Start: 2024-08-23 | End: 2024-08-23

## 2024-08-23 RX ORDER — MORPHINE SULFATE 4 MG/ML
4 INJECTION, SOLUTION INTRAMUSCULAR; INTRAVENOUS EVERY 10 MIN PRN
Status: DISCONTINUED | OUTPATIENT
Start: 2024-08-23 | End: 2024-08-23

## 2024-08-23 RX ORDER — HYDROMORPHONE HYDROCHLORIDE 1 MG/ML
0.2 INJECTION, SOLUTION INTRAMUSCULAR; INTRAVENOUS; SUBCUTANEOUS EVERY 5 MIN PRN
Status: DISCONTINUED | OUTPATIENT
Start: 2024-08-23 | End: 2024-08-23

## 2024-08-23 RX ORDER — NALOXONE HYDROCHLORIDE 0.4 MG/ML
0.08 INJECTION, SOLUTION INTRAMUSCULAR; INTRAVENOUS; SUBCUTANEOUS AS NEEDED
Status: DISCONTINUED | OUTPATIENT
Start: 2024-08-23 | End: 2024-08-23

## 2024-08-23 RX ADMIN — MIDAZOLAM HYDROCHLORIDE 2 MG: 1 INJECTION INTRAMUSCULAR; INTRAVENOUS at 13:05:00

## 2024-08-23 NOTE — OPERATIVE REPORT
Operative Note    Patient Name: Ori Umana Jr.    Preoperative Diagnosis: Foreign body of right thigh, initial encounter [S70.611A]    Postoperative Diagnosis: Foreign body of right thigh, initial encounter [S70.348A]    Primary Surgeon: BARBARA STEPHENSON MD     Assistant: Mirta    Procedures: R thigh subcutaneous foreign body removal with fluoroscopy    Surgical Findings: above    Anesthesia: MAC/local    Complications: none    Specimen: R thigh foreign bodies to pathology    Drains: none    Condition: stable to RR    Estimated Blood Loss: 10cc    BARBARA STEPHENSON MD

## 2024-08-23 NOTE — ANESTHESIA PREPROCEDURE EVALUATION
Anesthesia PreOp Note    HPI:     Ori Umana Jr. is a 19 year old male who presents for preoperative consultation requested by: Jae Arias MD    Date of Surgery: 8/23/2024    Procedure(s):  Right thigh foregin body removal  Indication: Foreign body of right thigh, initial encounter [S70.351A]    Relevant Problems   No relevant active problems       NPO:  Last Liquid Consumption Date: 08/23/24  Last Liquid Consumption Time: 0000  Last Solid Consumption Date: 08/23/24  Last Solid Consumption Time: 0000  Last Liquid Consumption Date: 08/23/24          History Review:  Patient Active Problem List    Diagnosis Date Noted    S/P Amadou-Taussig shunt, [facility],July,2003 07/26/2014    S/P bidirectional Rico shunt, [facility],2004 07/26/2014    S/P Fontan procedure, [facility],June,2006 07/26/2014    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   Past Week    Tadalafil 20 MG Oral Tab Take 1 tablet (20 mg total) by mouth Noon.   8/22/2024 at 1200    aspirin 81 MG Oral Tab Take 1 tablet (81 mg total) by mouth daily. take 1 tablet (81MG)  by oral route  every day   8/22/2024 at 1200    Enalapril Maleate (VASOTEC) 2.5 MG Oral Tab Take 1 tablet (2.5 mg total) by mouth daily. take 1 tablet (2.5MG)  by oral route  every day   More than a month     Current Facility-Administered Medications Ordered in Epic   Medication Dose Route Frequency Provider Last Rate Last Admin    lactated ringers infusion   Intravenous Continuous Jae Arias MD 20 mL/hr at 08/23/24 1040 New Bag at 08/23/24 1040     ceFAZolin (Ancef) 2g in 10mL IV syringe premix  2 g Intravenous On Call to OR Angela Kirby PA-C         No current Epic-ordered outpatient medications on file.       Allergies   Allergen Reactions    Cashews ANGIOEDEMA and SWELLING    Diphenhydramine OTHER (SEE COMMENTS)     Becomes hyperactive with Benadryl use    Pistachios SWELLING    Shellfish Allergy HIVES       Family History   Problem Relation Age of Onset    Heart Disease Father     High Blood Pressure Father     Heart Disease Mother     High Blood Pressure Mother     High Blood Pressure Maternal Grandmother     Heart Disease Maternal Grandmother     Arthritis Maternal Grandmother     Stroke Maternal Grandmother     Heart Disease Maternal Grandfather     High Blood Pressure Maternal Grandfather     Heart Disease Paternal Grandfather     High Blood Pressure Paternal Grandfather     Stroke Paternal Grandfather      Social History     Socioeconomic History    Marital status: Single   Tobacco Use    Smoking status: Never    Smokeless tobacco: Never   Substance and Sexual Activity    Alcohol use: Never    Drug use: Never       Available pre-op labs reviewed.             Vital Signs:  Body mass index is 24.78 kg/m².   height is 1.727 m (5' 8\") and weight is 73.9 kg (163 lb). His oral temperature is 98.1 °F (36.7 °C). His blood pressure is 141/53 and his pulse is 76. His respiration is 18 and oxygen saturation is 95%.   Vitals:    08/20/24 1645 08/23/24 1034   BP:  141/53   Pulse:  76   Resp:  18   Temp:  98.1 °F (36.7 °C)   TempSrc:  Oral   SpO2:  95%   Weight: 72.6 kg (160 lb) 73.9 kg (163 lb)   Height: 1.727 m (5' 8\")         Anesthesia Evaluation     Patient summary reviewed and Nursing notes reviewed    Airway   Mallampati: II  TM distance: >3 FB  Neck ROM: full  Dental - Dentition appears grossly intact     Pulmonary - negative ROS and normal exam   Cardiovascular - normal exam  Exercise tolerance: good    ROS comment: Tricuspid atresia, s/p Fontan and  Amadou-Taussig shunt.     Neuro/Psych - negative ROS     GI/Hepatic/Renal - negative ROS     Endo/Other - negative ROS   Abdominal  - normal exam                 Anesthesia Plan:   ASA:  3  Plan:   MAC  Plan Comments: Discussed with patient and surgeon, plan for local anesthesia and light sedation only given cardiac history and desire to avoid GA.  Informed Consent Plan and Risks Discussed With:  Patient and father      I have informed Ori Krishna Dong Chandra and/or legal guardian or family member of the nature of the anesthetic plan, benefits, risks including possible dental damage if relevant, major complications, and any alternative forms of anesthetic management.   All of the patient's questions were answered to the best of my ability. The patient desires the anesthetic management as planned.  Lynn Collier MD  8/23/2024 12:59 PM  Present on Admission:  **None**

## 2024-08-23 NOTE — TELEPHONE ENCOUNTER
Spoke with mom and moved appointment to 8/28 at 4 pm. Dr. Arias is not here on either 9/2 or 8/30 and patient is unable to make it on the 29th due to another family member having surgery.

## 2024-08-23 NOTE — ANESTHESIA POSTPROCEDURE EVALUATION
Patient: Ori Umana Jr.    Procedure Summary       Date: 08/23/24 Room / Location: [facility] MAIN OR [address] [place] [address] MAIN OR    Anesthesia Start: 1302 Anesthesia Stop: 1339    Procedure: Right thigh foregin body removal (Right: Thigh) Diagnosis:       Foreign body of right thigh, initial encounter      (Foreign body of right thigh, initial encounter [S70.351A])    Surgeons: Jae Arias MD Anesthesiologist: Lynn Collier MD    Anesthesia Type: MAC ASA Status: 3            Anesthesia Type: MAC    Vitals Value Taken Time   /60 08/23/24 1340   Temp 97.6F 08/23/24 1341   Pulse 63 08/23/24 1340   Resp 13 08/23/24 1340   SpO2 92 % 08/23/24 1340   Vitals shown include unfiled device data.    [facility] AN Post Evaluation:   Patient Evaluated in PACU  Patient Participation: complete - patient participated  Level of Consciousness: sleepy but conscious  Pain Score: 0  Pain Management: adequate  Airway Patency:patent  Dental exam unchanged from preop  Yes    Cardiovascular Status: acceptable  Respiratory Status: acceptable  Postoperative Hydration acceptable      Lynn Collier MD  8/23/2024 1:41 PM

## 2024-08-23 NOTE — PROGRESS NOTES
Pt should remain off work as a  for 1 week to allow surgical sites to heal.  Follow up as outpatient in 1 week.

## 2024-08-23 NOTE — TELEPHONE ENCOUNTER
Patient mother stating patient had surgery today and post op appointment needs to be reschedule for either 8/30 pr 9/2. No openings available.Please advise

## 2024-08-24 NOTE — OPERATIVE REPORT
[facility]    PATIENT'S NAME: HOLDEN CARLTON JR.   ATTENDING PHYSICIAN: Jae Arias MD   OPERATING PHYSICIAN: Jae Arias MD   PATIENT ACCOUNT#:   [id number]    LOCATION:  [address]  MEDICAL RECORD #:   [id number]       YOB: 2005  ADMISSION DATE:       08/23/2024      OPERATION DATE:  08/23/2024    OPERATIVE REPORT    PREOPERATIVE DIAGNOSIS:  Right thigh foreign body x2.  POSTOPERATIVE DIAGNOSIS:  Right thigh foreign body x2.  PROCEDURE:  Right thigh open foreign body removal x2.    ASSISTANT:  Angela Kirby PA-C.    ANESTHESIA:  Monitored sedation with local.    COMPLICATIONS:  None.    SPECIMEN:  Right thigh subcutaneous foreign bodies to Pathology.    DRAIN:  None.    INDICATIONS:  Patient is a 19-year-old male  who injured his right thigh while at work.  He was using a saw to cut metal and some metal fragments struck his thigh and penetrated the skin.  He was unable to remove the fragments, had some continued pain and sensitivity at the area, and had x-rays which showed foreign bodies in the subcutaneous layer, anterolateral thigh.  He now presents for foreign body removal.    OPERATIVE TECHNIQUE:  The patient was identified in the preoperative holding area.  The appropriate consents were obtained.  He was taken to the operating room and placed in supine position on the operating room table.  After adequate IV sedation was achieved, a tourniquet was placed in the right thigh.  The right thigh and lower extremity were then prepped and draped in a sterile fashion.  Then, 5 mL of 2% lidocaine was locally infiltrated at the site of the anterolateral thigh scars.  There were 2 small scars approximately 2 mm in diameter where the metallic foreign bodies penetrated the skin.  After appropriate anesthesia, 2 longitudinal incisions were made over the scars approximately 5 to 6 mm in length.  Blunt dissection was carried down into subcutaneous layer and  2 metallic foreign bodies were identified and removed without difficulty.  The area was then copiously irrigated.  AP and lateral fluoroscopic images confirmed no persistent radiopaque foreign bodies.  The incisions were then closed with 3-0 nylon sutures in interrupted fashion.  A sterile dressing was applied.  The patient's anesthesia was reversed.  He was taken to the recovery room in stable condition.  All sponge and instrument counts were reported as correct.  The attending physician, Dr. Arias, was present and performed all critical portions of the procedure.  There were no complications.  First assistant was medically necessary for this surgery.  She assisted with patient positioning and retraction of soft tissues for removal of the foreign body.  She also assisted with hemostasis and wound closure.  Without the aid of the assistant, the surgical procedure would not have been possible.    Dictated By Jae Arias MD  d: 08/23/2024 13:34:15  t: 08/23/2024 23:00:43  Job [id number]  DLO/

## 2024-08-28 ENCOUNTER — OFFICE VISIT (OUTPATIENT)
Dept: ORTHOPEDICS CLINIC | Facility: CLINIC | Age: 19
End: 2024-08-28

## 2024-08-28 DIAGNOSIS — M79.5 FOREIGN BODY (FB) IN SOFT TISSUE: Primary | ICD-10-CM

## 2024-08-28 PROCEDURE — 99024 POSTOP FOLLOW-UP VISIT: CPT | Performed by: ORTHOPAEDIC SURGERY

## 2024-08-28 NOTE — PROGRESS NOTES
NURSING INTAKE COMMENTS:   Chief Complaint   Patient presents with    Post-Op     1st pov R thigh FB removal -   Pt healing well.  Pt denies any pain, just some irritation. No fever or chills.         HPI: This 19 year old male presents today with complaints of right eye surgery follow-up.  He is now 5 days postoperative from a foreign body removal.  He reports no problem with his incisions.  He has no significant pain.  No fevers or chills.  No drainage from the area.    Past Medical History:    Congenital anomaly of heart (HCC)    Congenital heart disease (HCC)    Eczema    Food allergy    Recurrent acute otitis media     Past Surgical History:   Procedure Laterality Date    Cardiac surgery       Current Outpatient Medications   Medication Sig Dispense Refill    HYDROcodone-acetaminophen 5-325 MG Oral Tab Take 1 tablet by mouth every 6 (six) hours as needed for Pain. 3 tablet 0    Multiple Vitamin (MULTI-VITAMIN) Oral Tab Take 1 tablet by mouth daily.      Tadalafil 20 MG Oral Tab Take 1 tablet (20 mg total) by mouth Noon.      aspirin 81 MG Oral Tab Take 1 tablet (81 mg total) by mouth daily. take 1 tablet (81MG)  by oral route  every day      Enalapril Maleate (VASOTEC) 2.5 MG Oral Tab Take 1 tablet (2.5 mg total) by mouth daily. take 1 tablet (2.5MG)  by oral route  every day       Allergies   Allergen Reactions    Cashews ANGIOEDEMA and SWELLING    Diphenhydramine OTHER (SEE COMMENTS)     Becomes hyperactive with Benadryl use    Pistachios SWELLING    Shellfish Allergy HIVES     Family History   Problem Relation Age of Onset    Heart Disease Father     High Blood Pressure Father     Heart Disease Mother     High Blood Pressure Mother     High Blood Pressure Maternal Grandmother     Heart Disease Maternal Grandmother     Arthritis Maternal Grandmother     Stroke Maternal Grandmother     Heart Disease Maternal Grandfather     High Blood Pressure Maternal Grandfather     Heart Disease Paternal Grandfather      High Blood Pressure Paternal Grandfather     Stroke Paternal Grandfather        Social History     Occupational History    Not on file   Tobacco Use    Smoking status: Never    Smokeless tobacco: Never   Substance and Sexual Activity    Alcohol use: Never    Drug use: Never    Sexual activity: Not on file        Review of Systems:  GENERAL: denies fevers, chills, night sweats, fatigue, unintentional weight loss/gain  SKIN: denies skin lesions, open sores, rash  HEENT:denies recent vision change, new nasal congestion,hearing loss, tinnitus, sore throat, headaches  RESPIRATORY: denies new shortness of breath, cough, asthma, wheezing  CARDIOVASCULAR: denies chest pain, leg cramps with exertion, palpitations, leg swelling  GI: denies abdominal pain, nausea, vomiting, diarrhea, constipation, hematochezia, worsening heartburn or stomach ulcers  : denies dysuria, hematuria, incontinence, increased frequency, urgency, difficulty urinating  MUSCULOSKELETAL: denies musculoskeletal complaints other than in HPI  NEURO: denies numbness, tingling, weakness, balance issues, dizziness, memory loss  PSYCHIATRIC: denies Hx of depression, anxiety, other psychiatric disorders  HEMATOLOGIC: denies blood clots, anemia, blood clotting disorders, blood transfusion  ENDOCRINE: denies autoimmune disease, thyroid issues, or diabetes  ALLERGY: denies asthma, seasonal allergies    Physical Examination:    There were no vitals taken for this visit.  Constitutional: appears well hydrated, alert and responsive, no acute distress noted  Extremities: Right thigh incisions are well-healed.  No erythema or palpable warmth.  No palpable fluctuance.  Calf is nontender nonswollen  Neurological: Unchanged    Imaging:   XR FLUOROSCOPY C-ARM TIME LESS THAN 1 HOUR (CPT=76000)    Result Date: 8/23/2024  PROCEDURE: XR FLUORO PHYSICIAN TIME< 1 HOUR (CPT=76000)  COMPARISON: [facility], XR KNEE, COMPLETE (4 OR MORE VIEWS), RIGHT  (CPT=73564), 7/01/2024, 3:34 PM.  INDICATIONS: Right thigh foregin body removal under fluoro.  Findings and impression:  Procedural fluoroscopy was utilized.  Two images were saved.  6.2 seconds fluoroscopy time.  Estimated dose 0.45 mGy Finalized by (CST): Mark Saavedra MD on 8/23/2024 at 1:42 PM             Labs:  Lab Results   Component Value Date    WBC 5.2 12/27/2023    HGB 16.3 12/27/2023    .0 (L) 12/27/2023      Lab Results   Component Value Date    GLU 88 12/27/2023    BUN 11 12/27/2023    CREATSERUM 1.13 12/27/2023        Assessment and Plan:  Diagnoses and all orders for this visit:    Foreign body (FB) in soft tissue        Assessment: Healing right thigh after foreign body removal    Plan: Sutures were removed today.  Steri-Strips were placed over the skin.  Advised keeping the area dry for an additional 4 or 5 days.  May resume activities as tolerated.  May return to work without restrictions on Monday.  He has reached maximal medical improvement.  He is stable and stationary.  Follow-up with me again as needed.    Follow Up: Return if symptoms worsen or fail to improve.    BARBARA STEPHENSON MD

## 2024-09-12 ENCOUNTER — APPOINTMENT (OUTPATIENT)
Dept: PEDIATRIC CARDIOLOGY | Age: 19
End: 2024-09-12
Attending: PEDIATRICS

## 2024-09-12 ENCOUNTER — APPOINTMENT (OUTPATIENT)
Dept: PEDIATRIC CARDIOLOGY | Age: 19
End: 2024-09-12

## 2024-09-12 VITALS
WEIGHT: 165.57 LBS | SYSTOLIC BLOOD PRESSURE: 134 MMHG | HEIGHT: 68 IN | OXYGEN SATURATION: 97 % | BODY MASS INDEX: 25.09 KG/M2 | DIASTOLIC BLOOD PRESSURE: 63 MMHG | HEART RATE: 74 BPM

## 2024-09-12 DIAGNOSIS — Q22.4 TRICUSPID ATRESIA (CMD): ICD-10-CM

## 2024-09-12 DIAGNOSIS — Q22.4 TRICUSPID ATRESIA (CMD): Primary | ICD-10-CM

## 2024-09-12 DIAGNOSIS — Q20.8 FONTAN CIRCULATION PRESENT (CMD): ICD-10-CM

## 2024-09-12 DIAGNOSIS — Z87.74 FONTAN CIRCULATION PRESENT: ICD-10-CM

## 2024-09-12 LAB
AORTIC ROOT: 3.19 CM (ref 2.35–3.34)
AORTIC VALVE ANNULUS: 2.23 CM (ref 1.66–2.43)
BSA FOR PED ECHO PROCEDURE: 1.91 M2
EJECTION FRACTION: 559 %
LEFT VENTRICLE EJECTION FRACTION BY SIMPSON 2 CHAMBER (%): 59 %
LEFT VENTRICLE EJECTION FRACTION BY SIMPSON BP (%): 59 %
LEFT VENTRICLE LONGITUDINAL STRAIN BY ENDOCARDIAL GLS A2C (%): -16.3 %
LEFT VENTRICLE LONGITUDINAL STRAIN BY ENDOCARDIAL GLS A3C (%): -17.6 %
LEFT VENTRICLE LONGITUDINAL STRAIN BY ENDOCARDIAL GLS A4C (%): -16.7 %
LEFT VENTRICLE LONGITUDINAL STRAIN BY ENDOCARDIAL GLS AVERAGE (%): -16.9 %
SINOTUBULAR JUNCTION: 2.32 CM (ref 1.9–2.77)
Z SCORE OF AORTIC VALVE ANNULUS PHN: 1 CM
Z-SCORE OF AORTIC ROOT: 1.4 CM
Z-SCORE OF SINOTUBULAR JUNCTION PHN: -0.1 CM

## 2024-09-12 PROCEDURE — 93320 DOPPLER ECHO COMPLETE: CPT | Performed by: PEDIATRICS

## 2024-09-12 PROCEDURE — 99215 OFFICE O/P EST HI 40 MIN: CPT | Performed by: PEDIATRICS

## 2024-09-12 PROCEDURE — 93303 ECHO TRANSTHORACIC: CPT | Performed by: PEDIATRICS

## 2024-09-12 PROCEDURE — 93325 DOPPLER ECHO COLOR FLOW MAPG: CPT | Performed by: PEDIATRICS

## 2025-02-24 ENCOUNTER — TELEPHONE (OUTPATIENT)
Dept: PEDIATRIC CARDIOLOGY | Age: 20
End: 2025-02-24

## 2025-03-14 ENCOUNTER — APPOINTMENT (OUTPATIENT)
Dept: PEDIATRIC CARDIOLOGY | Age: 20
End: 2025-03-14

## 2025-03-28 ENCOUNTER — LAB SERVICES (OUTPATIENT)
Dept: LAB | Age: 20
End: 2025-03-28

## 2025-03-28 ENCOUNTER — APPOINTMENT (OUTPATIENT)
Dept: PEDIATRIC CARDIOLOGY | Age: 20
End: 2025-03-28

## 2025-03-28 VITALS
OXYGEN SATURATION: 92 % | WEIGHT: 175.49 LBS | SYSTOLIC BLOOD PRESSURE: 121 MMHG | DIASTOLIC BLOOD PRESSURE: 62 MMHG | BODY MASS INDEX: 27.54 KG/M2 | HEIGHT: 67 IN | HEART RATE: 100 BPM

## 2025-03-28 DIAGNOSIS — Q20.8 FONTAN CIRCULATION PRESENT: ICD-10-CM

## 2025-03-28 DIAGNOSIS — Q22.4 TRICUSPID ATRESIA (CMD): ICD-10-CM

## 2025-03-28 DIAGNOSIS — Z87.74 FONTAN CIRCULATION PRESENT: Primary | ICD-10-CM

## 2025-03-28 LAB
BASOPHILS # BLD: 0 K/MCL (ref 0–0.3)
BASOPHILS NFR BLD: 1 %
DEPRECATED RDW RBC: 42 FL (ref 39–50)
EOSINOPHIL # BLD: 0.1 K/MCL (ref 0–0.5)
EOSINOPHIL NFR BLD: 2 %
ERYTHROCYTE [DISTWIDTH] IN BLOOD: 12.7 % (ref 11–15)
HBA1C MFR BLD: 5.3 % (ref 4.5–5.6)
HCT VFR BLD CALC: 49.6 % (ref 39–51)
HGB BLD-MCNC: 17.1 G/DL (ref 13–17)
IMM GRANULOCYTES # BLD AUTO: 0.1 K/MCL (ref 0–0.2)
IMM GRANULOCYTES # BLD: 1 %
INR PPP: 1.1
LYMPHOCYTES # BLD: 1.8 K/MCL (ref 1–4.8)
LYMPHOCYTES NFR BLD: 28 %
MCH RBC QN AUTO: 31.5 PG (ref 26–34)
MCHC RBC AUTO-ENTMCNC: 34.5 G/DL (ref 32–36.5)
MCV RBC AUTO: 91.5 FL (ref 78–100)
MONOCYTES # BLD: 0.6 K/MCL (ref 0.3–0.9)
MONOCYTES NFR BLD: 9 %
NEUTROPHILS # BLD: 3.9 K/MCL (ref 1.8–7.7)
NEUTROPHILS NFR BLD: 59 %
NRBC BLD MANUAL-RTO: 0 /100 WBC
PLATELET # BLD AUTO: 171 K/MCL (ref 140–450)
PROTHROMBIN TIME: 11.3 SEC (ref 9.7–11.8)
RBC # BLD: 5.42 MIL/MCL (ref 4.5–5.9)
WBC # BLD: 6.5 K/MCL (ref 4.2–11)

## 2025-03-29 LAB
25(OH)D3+25(OH)D2 SERPL-MCNC: 93.6 NG/ML (ref 30–100)
AFP-TM SERPL-MCNC: <3 NG/ML
ALBUMIN SERPL-MCNC: 4.6 G/DL (ref 3.4–5)
ALBUMIN/GLOB SERPL: 1.5 {RATIO} (ref 1–2.4)
ALP SERPL-CCNC: 71 UNITS/L (ref 45–117)
ALT SERPL-CCNC: 43 UNITS/L
ANION GAP SERPL CALC-SCNC: 16 MMOL/L (ref 7–19)
AST SERPL-CCNC: 31 UNITS/L
BILIRUB SERPL-MCNC: 1.9 MG/DL (ref 0.2–1)
BUN SERPL-MCNC: 19 MG/DL (ref 6–20)
BUN/CREAT SERPL: 13 (ref 7–25)
CALCIUM SERPL-MCNC: 9.5 MG/DL (ref 8.4–10.2)
CHLORIDE SERPL-SCNC: 100 MMOL/L (ref 97–110)
CHOLEST SERPL-MCNC: 136 MG/DL
CHOLEST/HDLC SERPL: 2.8 {RATIO}
CO2 SERPL-SCNC: 27 MMOL/L (ref 21–32)
CREAT SERPL-MCNC: 1.5 MG/DL (ref 0.67–1.17)
CYSTATIN C SERPL-MCNC: 0.86 MG/L (ref 0.5–1)
EGFRCR SERPLBLD CKD-EPI 2021: 68 ML/MIN/{1.73_M2}
EGFRCR-CYS SERPLBLD CKD-EPI 2021: >90 M/L/MIN/1.73M2
FASTING DURATION TIME PATIENT: ABNORMAL H
FERRITIN SERPL-MCNC: 103 NG/ML (ref 26–388)
GGT SERPL-CCNC: 52 UNITS/L (ref 15–85)
GLOBULIN SER-MCNC: 3 G/DL (ref 2–4)
GLUCOSE SERPL-MCNC: 82 MG/DL (ref 70–99)
HDLC SERPL-MCNC: 48 MG/DL
IRON SATN MFR SERPL: 37 % (ref 15–45)
IRON SERPL-MCNC: 115 MCG/DL (ref 65–175)
LDLC SERPL CALC-MCNC: 44 MG/DL
MAGNESIUM SERPL-MCNC: 1.6 MG/DL (ref 1.7–2.4)
NONHDLC SERPL-MCNC: 88 MG/DL
NT-PROBNP SERPL-MCNC: <35 PG/ML
POTASSIUM SERPL-SCNC: 4.4 MMOL/L (ref 3.4–5.1)
PROT SERPL-MCNC: 7.6 G/DL (ref 6.4–8.2)
SODIUM SERPL-SCNC: 139 MMOL/L (ref 135–145)
TIBC SERPL-MCNC: 310 MCG/DL (ref 250–450)
TRIGL SERPL-MCNC: 218 MG/DL
TSH SERPL-ACNC: 1.99 MCUNITS/ML (ref 0.35–5)

## 2025-04-02 ENCOUNTER — E-ADVICE (OUTPATIENT)
Dept: PEDIATRIC CARDIOLOGY | Age: 20
End: 2025-04-02

## 2025-04-02 DIAGNOSIS — Q22.4 TRICUSPID ATRESIA (CMD): ICD-10-CM

## 2025-04-02 DIAGNOSIS — Z87.74 FONTAN CIRCULATION PRESENT: Primary | ICD-10-CM

## 2025-05-29 DIAGNOSIS — Z87.74 FONTAN CIRCULATION PRESENT: Primary | ICD-10-CM

## 2025-05-29 RX ORDER — TADALAFIL 20 MG/1
20 TABLET ORAL DAILY
Qty: 90 TABLET | Refills: 3 | Status: SHIPPED | OUTPATIENT
Start: 2025-05-29

## (undated) DEVICE — CONTAINER SPEC COLL AND TRNSPRT W/ WHT CAP

## (undated) DEVICE — SKIN PREP TRAY 4 COMPARTM TRAY: Brand: MEDLINE INDUSTRIES, INC.

## (undated) DEVICE — PACK CDS LOWER EXTREMITY

## (undated) DEVICE — SUT ETHLN 3-0 30IN FS-1 NABSRB BLK 24MM 3/8 C

## (undated) DEVICE — BNDG,ELSTC,MATRIX,STRL,4"X5YD,LF,HOOK&LP: Brand: MEDLINE

## (undated) DEVICE — GAMMEX® PI HYBRID SIZE 8, STERILE POWDER-FREE SURGICAL GLOVE, POLYISOPRENE AND NEOPRENE BLEND: Brand: GAMMEX

## (undated) DEVICE — SOLUTION IRRIG 3000ML 0.9% NACL FLX CONT

## (undated) DEVICE — SUCTION CANISTER, 3000CC,SAFELINER: Brand: DEROYAL

## (undated) DEVICE — COTTON UNDERCAST PADDING,REGULAR FINISH: Brand: WEBRIL

## (undated) DEVICE — SOLUTION IRRIG 1000ML 0.9% NACL USP BTL

## (undated) DEVICE — INTENDED FOR TISSUE SEPARATION, AND OTHER PROCEDURES THAT REQUIRE A SHARP SURGICAL BLADE TO PUNCTURE OR CUT.: Brand: BARD-PARKER ® STAINLESS STEEL BLADES

## (undated) DEVICE — APPLICATOR PREP 26ML CHG 2% ISO ALC 70%

## (undated) DEVICE — PAD,ABDOMINAL,8"X7.5",STERILE,LF,1/PK: Brand: MEDLINE

## (undated) DEVICE — GAMMEX® PI HYBRID SIZE 6.5, STERILE POWDER-FREE SURGICAL GLOVE, POLYISOPRENE AND NEOPRENE BLEND: Brand: GAMMEX

## (undated) NOTE — LETTER
8/28/2024          To Whom It May Concern:    Ori Umana Jr. is currently under my medical care. He may return to work with no restrictions on 09/02/24.     If you require additional information please contact our office.        Sincerely,    BARBARA STEPHENSON MD

## (undated) NOTE — LETTER
7/11/2024              Ori Umana Jr.        [birth year][address] [facility] [address]         To Whom it may concern:    This is to certify that Ori Umana Jr. had an appointment on 7/11/2024 at 09:45 AM with BARBARA STEPHENSON MD.        Sincerely,    BARBARA STEPHENSON MD  [address] [phone]